# Patient Record
Sex: FEMALE
[De-identification: names, ages, dates, MRNs, and addresses within clinical notes are randomized per-mention and may not be internally consistent; named-entity substitution may affect disease eponyms.]

---

## 2018-12-19 ENCOUNTER — HOSPITAL ENCOUNTER (OUTPATIENT)
Dept: HOSPITAL 39 - GMAH | Age: 58
End: 2018-12-19
Attending: FAMILY MEDICINE
Payer: OTHER GOVERNMENT

## 2018-12-19 DIAGNOSIS — M54.2: Primary | ICD-10-CM

## 2019-01-04 ENCOUNTER — HOSPITAL ENCOUNTER (OUTPATIENT)
Dept: HOSPITAL 39 - MRI | Age: 59
End: 2019-01-04
Attending: FAMILY MEDICINE
Payer: OTHER GOVERNMENT

## 2019-01-04 DIAGNOSIS — M47.892: ICD-10-CM

## 2019-01-04 DIAGNOSIS — M25.461: ICD-10-CM

## 2019-01-04 DIAGNOSIS — M75.52: ICD-10-CM

## 2019-01-04 DIAGNOSIS — M25.78: ICD-10-CM

## 2019-01-04 DIAGNOSIS — S46.011A: ICD-10-CM

## 2019-01-04 DIAGNOSIS — M25.561: Primary | ICD-10-CM

## 2019-01-04 NOTE — RAD
EXAM DESCRIPTION: 



Knee,Right Complete



CLINICAL HISTORY: 



58 yearsFemale, KNEE PAIN



COMPARISON: 



None.



IMPRESSION: 



3 views of the right knee demonstrate no evidence of acute

fracture, dislocation, or destructive osseous lesion.



There are mild changes of osteoarthritis involving all 3

compartments of the knee with narrowing of the joint spaces and

small marginal osteophytes.



Suprapatellar joint effusion. If further imaging is desired,

follow-up MRI may further evaluate for internal derangement. 



Electronically signed by:  Ish Rodriguez MD  1/4/2019 11:47 AM [facility]

Workstation: [id number]

## 2019-01-05 NOTE — MRI
EXAM DESCRIPTION: 

Shoulder,Left: Magnetic Resonance Imaging.



CLINICAL HISTORY: 

PAIN IN LEFT SHOULDER



COMPARISON: 

MRI scan cervical spine on the same visit.



TECHNIQUE: 

Multiplanar, high-field MRI, multiple sequences, without

contrast, left shoulder.



FINDINGS: 

Intermediate signal and edema signal in the insertion of the

supraspinatus tendon, more anterior with minimal fluid at the

insertion site. Hypertrophic changes on the anterior tuberosity.

Subcortical cystic changes mid aspect of the greater tuberosity.

Abutting the undersurface insertion of the anterior infraspinatus

tendon. Minimal edema signal in the distal tendon. Minimal edema

in the subacromial-subdeltoid bursa. Grade 2 atrophic changes in

the teres minor muscle. Tendon is negative. Subscapularis tendon

unremarkable.



No effusion in the AC joint. Minimal downsloping of the lateral

acromion with type II curvature. Coracoid ligaments are intact.

Moderate subcoracoid bursal fluid. Minimal narrowing of the

supraspinatus tendon outlet at the musculotendinous junction.



No glenohumeral joint effusion. No subchondral lesions on the

articular surfaces. Normal signal tissue projecting anteriorly

from the superior anterior labrum and or the superior

glenohumeral ligament encroaching on the lateral aspect of the

subcoracoid bursa. Persistent small fissure between the posterior

bicipital labral anchor and labrum. Normal signal in the

remainder of the labrum. Long head biceps tendon within the

bicipital groove.



IMPRESSION: 

1. Partial-thickness undersurface insertion tear of the anterior

fibers of the supraspinatus tendon but not a rim rent tear.

Distal supraspinatus tendinitis. Tendinopathy of the distal

infraspinatus tendon. Subcortical erosion on the mid greater

tuberosity and anterior hypertrophic changes on the tuberosity.

2. Muscle atrophy in the teres minor compared to the remainder of

the supraspinatus tendon muscles. This could be related to

compression of a branch of the axillary nerve supplying the

muscle, most commonly from quadrangular space syndrome. There may

be associated paresthesias in the arm. No dominant fluid or soft

tissue mass seen in the quadrangular space.

3. Minimal narrowing of the supraspinatus tendon outlet and

subcoracoid bursitis.

4. Abnormal soft tissue in the anterior aspect of the subcoracoid

bursa which could be projecting from the superior glenohumeral

ligament, anterior labrum, or anterior glenoid.

5. Focal tear of the posterior aspect of the superior labrum at

the junction with the bicipital labral anchor.



Electronically signed by:  Negrito Adkins MD  1/5/2019 1:49 PM [facility]

Workstation: [id number]

## 2019-01-05 NOTE — MRI
EXAM DESCRIPTION: 

Cervical Spine: MRI.



CLINICAL HISTORY: 

58 years Female CERVICALGIA



COMPARISON: 

MRI scan of the left shoulder on the same visit..



TECHNIQUE: 

Multiplanar, high-field MRI, multiple sequences, non-contrast 

Cervical spine.



FINDINGS: 

C3-4: Minimal disc desiccation with disc space preserved. Left

uncinate spur and mild to moderate left neural foraminal

narrowing. Minimal arthrosis left facet. Canal is patent.



C4-5: Moderate disc space loss and endplate reactive changes

advanced on the right with Schmorl's nodes. 2 mm grade 1

retrolisthesis. Posterior midline disc osteophyte complex bulging

3 mm in the midline and right of midline abutting the cord with

mild canal narrowing. Right uncinate spur, larger than left.

Right side disc osteophyte complex encroaching on the neural

foramen which is stenotic. Moderate left neural foraminal

narrowing.



C5-6: Disc desiccation moderate disc space loss. Endplate

reactive changes more on the right than the left. Bilateral

uncinate spurs larger on the right. Hypertrophy of the flavum

ligaments and moderate canal narrowing. 2 mm grade 1

retrolisthesis. Disc osteophyte complex on the right resulting in

right neural foraminal stenosis. Mild to moderate left neural

foraminal narrowing. Facets are unremarkable. 



C6-7: Disc desiccation and minimal disc space loss. Trace

posterior disc bulge. Left uncinate spur and left disc osteophyte

complex with moderate left neural foraminal narrowing. Mild canal

narrowing.



C7-T1: Disc desiccation. Tiny posterior bulge. Bilateral uncinate

spurs bilateral mild to moderate neural foraminal narrowing. Mild

canal narrowing. Facets are unremarkable. Well-circumscribed

hyperintense T1 and T2 signal in the C7 vertebral body anteriorly

in the midline and in the left posterior T1 vertebral body and

left pedicle. These are consistent with hemangiomas.



Normal signal in the C2-3 disc and T1-T2 discs with no bulging.

Disc spaces preserved. Canal and neural foramina are patent.

Facets negative.



Spinal alignment C2-C5. Kyphosis.  No cord compression or cord

edema.  Atlantoaxial joint negative. Base of the cerebellar

tonsils is above the foramen magnum.  Paravertebral soft tissues

subcentimeter cysts/nodules anterior right lobe of thyroid gland.

No imaging follow-up is recommended.. Vertebral bodies are not

compressed at any level. Normal marrow signal in the remaining

vertebral bodies and the posterior elements.



IMPRESSION: 

1. Spondylosis on the right C5-6 disc space and uncinate spur

with right neural foraminal stenosis. Correlate for right C6

radiculopathy.

2. Spondylosis on the right at C4-5 with disc osteophyte complex

causing right neural foraminal stenosis. Correlate for right C5

radiculopathy.

3. Moderate narrowing of the left neural foramen at C6-7 by disc

osteophyte complex. Correlate for left C7 radiculopathy.

4. Please refer to left shoulder MRI examination and report.



Electronically signed by:  Negrito Adkins MD  1/5/2019 2:17 PM [facility]

Workstation: [id number]